# Patient Record
Sex: FEMALE | Race: WHITE | NOT HISPANIC OR LATINO | Employment: UNEMPLOYED | URBAN - NONMETROPOLITAN AREA
[De-identification: names, ages, dates, MRNs, and addresses within clinical notes are randomized per-mention and may not be internally consistent; named-entity substitution may affect disease eponyms.]

---

## 2018-06-12 ENCOUNTER — OFFICE VISIT (OUTPATIENT)
Dept: PSYCHIATRY | Facility: CLINIC | Age: 53
End: 2018-06-12

## 2018-06-12 DIAGNOSIS — F31.30 BIPOLAR I DISORDER, MOST RECENT EPISODE DEPRESSED (HCC): Primary | ICD-10-CM

## 2018-06-12 PROCEDURE — 90791 PSYCH DIAGNOSTIC EVALUATION: CPT | Performed by: SOCIAL WORKER

## 2018-06-12 NOTE — PROGRESS NOTES
"IDENTIFYING INFORMATION:   The patient is a 52 y.o. female who is here today for initial appointment from 2:40 PM to 3:40 PM.  Patient's aunt, Nadya Harris, who has Medical POA, brought patient to appointment and spoke with therapist prior to therapist meeting with patient.  Patient's aunt reports that she picked up patient from a psychiatric hospital in [place] a couple of weeks ago.  She reports patient has alienated all of her other family, lost her food stamps, ran away to [place] where she got into some kind of altercation, police were called, and she ended up in the psychiatric hospital.  She reports that patient's right hand and shoulder were injured during the altercation with police and she is not able to use it normally.  Nadya states that she insisted on having the POA before she would  her niece.  Patient is currently staying with her, and POA is trying to help her get disability, medical treatment for her arm and other services that she needs.  She feels the patient needs an intensive program with lots of therapy, and will continue her efforts to find an appropriate day program for patient.    CHIEF COMPLIANT:  Patient said that she thinks she has been \"severely depressed for a really long time.\"  She reports that her aunt picked her up from a psychiatric unit in [place].  She does not recall the name of the hospital or the town it was in.  She reports she went there for a mini vacation, and \"basically had a nervous breakdown.\"  She was in the hospital for 5 weeks and has been here with her aunt for 2 weeks.  She states that she did not give her permission to have a POA and doesn't want one.    HPI:  Patient reports loss of interest in things she used to enjoy.  She overeats to fill the void and gains weight easily.  She feels fatigued most days, has poor concentration, feels hopeless, helpless, worthless, and guilty about things she can't control.  She reports that she " does not have thoughts of harming herself, but she doesn't want to live with the pain. She sleeps 10-12 hours a night but has trouble falling asleep because of racing thoughts.  Patient reports having manic episodes in the past but has not had any in the last 1-1/2 years.  She reports when manic she had less need for sleep, increased energy and increased level of activity.  She also had pressured speech, flight of ideas, was easily distracted, and would engage in excessive gambling or buying sprees.  She reports that while she was staying with family in [place], after her divorce, she was also delusional, with ideas that she was going to  princes or other similar ideas.  Although she had no goal, she wanted to run away as an escape.    Patient is currently taking Risperdal which was prescribed in [place].  She prefers to take as little medication as possible because of concern about side effects or adverse reactions.    PAST PSYCHIATRIC HISTORY:   No hospitalizations prior to the recent one in [place].  She reports attending marriage counseling for a short time in the last few years of her marriage.  She also made one suicide attempt (took pills ) as a teenager, but was not treated.        SUBSTANCE ABUSE HISTORY:  Patient reports no drug abuse.  She states that she would drink too much when she did drink 6 or 7 times a year, but denies binging on alcohol.  Please note discrepancies between patient's current report and the discharge summary from [place] scanned into the chart.    MEDICAL HISTORY:  Poor hearing.  Currently has a torn right rotator cuff and can't close her right hand; possible nerve damage.  Patient reports having one .  Had 5 or 6 ear operations as a child.      CURRENT MEDICATIONS:  No current outpatient prescriptions on file.     No current facility-administered medications for this visit.          FAMILY HISTORY:  Patient reports her father had dementia and was  "alcoholic.  Her son and nephews are also alcoholics.  No other diagnosed mental health or substance abuse problems, but patient states \"I see signs.\"       SOCIAL HISTORY:  Patient reports she was raised by both parents in [place].  She has 1 sister.  Her childhood was \"average,\" but there was not a lot of affection expressed.  There might have been emotional abuse.  Her father was a mean drunk, but not physically abusive.      Patient reports she struggled with school due to her poor hearing.  It got worse in the last 5 years.  She had a few friends, but was awkward and never felt like she fit in.  She did not participate in school activities and less cool in the 10th grade.  Patient got pregnant and had her first child at age 18.  She  the father of her child and they had 3 children together.  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  The marriage lasted for 8 years.  Patient's second marriage lasted for 18 years.  They had 1 child together and patient was again a stay-at-home mom.  All of her children are now grown and living independently.  After her second divorce patient's circumstances got much worse.  She was living with her father in [place] the last 2 years, prior to her trip to [place].  Currently she does not have contact with her family other than her aunt, with whom she is staying.    Patient has never had a full time job.  She would like to get her life together, buy a car and be independent again, but she doesn't know how that can happen at this point.    Patient states that she believes in God, but does not attend Muslim currently because she is reliant on other people for transportation.  She reports that she tries to exercise, but has no hobbies and there is little she is able to do, except whatever her aunt does.      MENTAL STATUS EXAM:   Hygiene:   good  Cooperation:  Guarded  Eye Contact:  Fair  Psychomotor Behavior:  Appropriate  Affect:  Restricted  Hopelessness: 6  Speech:  Normal  Thought " Process:  Goal directed  Thought Content:  Normal  Suicidal:  Passive suicidal thoughts  Homicidal:  None  Hallucinations:  None  Delusion:  None  Memory:  Fair  Orientation:  Person, Place, Time and Situation  Reliability:  poor  Insight:  Poor  Judgement:  Fair  Impulse Control:  Fair  Physical/Medical Issues:  Injury to right shoulder/arm/hand    PROBLEM LIST:  Bipolar disorder, no income, dependent on her aunt for financial and other support.      STRENGTHS:  Motivated for treatment to get better.       WEAKNESSES:  Suspicious and distrustful.      SHORT-TERM GOALS: Patient will be compliant with clinic appointments.  Patient will be engaged in therapy, medication compliant with minimal side effects. Patient  will report decrease of symptoms and frequency.    LONG-TERM GOALS: Patient will have cessation of symptoms and be able to function at optimal levels without continued treatment.     DIAGNOSIS:     ICD-10-CM ICD-9-CM   1. Bipolar I disorder, most recent episode depressed F31.30 296.50         PLAN:   Patient will continue in biweekly individual outpatient treatment and pharmacotherapy as scheduled.  Patient has appointment with SHANIKA Posada next week for medication management, and appointment with therapist in 3 weeks.     Patient's aunt will also continue her efforts to find appropriate services to meet patient's needs.     The patient was instructed to contact the clinic, call 911, or present to the nearest emergency room if crisis occurs.         Layla Persaud LCSW

## 2018-06-14 ENCOUNTER — HOSPITAL ENCOUNTER (INPATIENT)
Facility: HOSPITAL | Age: 53
LOS: 4 days | Discharge: HOME | End: 2018-06-18
Attending: PSYCHIATRY & NEUROLOGY | Admitting: PSYCHIATRY & NEUROLOGY

## 2018-06-14 ENCOUNTER — HOSPITAL ENCOUNTER (EMERGENCY)
Facility: HOSPITAL | Age: 53
Discharge: STILL ADMITTED OR ADMINISTRATIVE | End: 2018-06-14
Attending: EMERGENCY MEDICINE

## 2018-06-14 VITALS
RESPIRATION RATE: 18 BRPM | TEMPERATURE: 98.4 F | SYSTOLIC BLOOD PRESSURE: 124 MMHG | HEART RATE: 68 BPM | BODY MASS INDEX: 28.52 KG/M2 | OXYGEN SATURATION: 100 % | HEIGHT: 62 IN | WEIGHT: 155 LBS | DIASTOLIC BLOOD PRESSURE: 76 MMHG

## 2018-06-14 DIAGNOSIS — F33.2 SEVERE EPISODE OF RECURRENT MAJOR DEPRESSIVE DISORDER, WITHOUT PSYCHOTIC FEATURES (HCC): Primary | ICD-10-CM

## 2018-06-14 PROBLEM — F32.9 MDD (MAJOR DEPRESSIVE DISORDER): Status: ACTIVE | Noted: 2018-06-14

## 2018-06-14 LAB
6-ACETYL MORPHINE: NEGATIVE
ALBUMIN SERPL-MCNC: 4.5 G/DL (ref 3.5–5)
ALBUMIN/GLOB SERPL: 1.9 G/DL (ref 1.5–2.5)
ALP SERPL-CCNC: 73 U/L (ref 35–104)
ALT SERPL W P-5'-P-CCNC: 15 U/L (ref 10–36)
AMPHET+METHAMPHET UR QL: NEGATIVE
ANION GAP SERPL CALCULATED.3IONS-SCNC: 3.8 MMOL/L (ref 3.6–11.2)
AST SERPL-CCNC: 14 U/L (ref 10–30)
BARBITURATES UR QL SCN: NEGATIVE
BASOPHILS # BLD AUTO: 0.06 10*3/MM3 (ref 0–0.3)
BASOPHILS NFR BLD AUTO: 0.7 % (ref 0–2)
BENZODIAZ UR QL SCN: NEGATIVE
BILIRUB SERPL-MCNC: 0.4 MG/DL (ref 0.2–1.8)
BILIRUB UR QL STRIP: NEGATIVE
BUN BLD-MCNC: 10 MG/DL (ref 7–21)
BUN/CREAT SERPL: 12.8 (ref 7–25)
BUPRENORPHINE SERPL-MCNC: NEGATIVE NG/ML
CALCIUM SPEC-SCNC: 9 MG/DL (ref 7.7–10)
CANNABINOIDS SERPL QL: NEGATIVE
CHLORIDE SERPL-SCNC: 111 MMOL/L (ref 99–112)
CLARITY UR: CLEAR
CO2 SERPL-SCNC: 28.2 MMOL/L (ref 24.3–31.9)
COCAINE UR QL: NEGATIVE
COLOR UR: YELLOW
CREAT BLD-MCNC: 0.78 MG/DL (ref 0.43–1.29)
DEPRECATED RDW RBC AUTO: 43.2 FL (ref 37–54)
EOSINOPHIL # BLD AUTO: 0.14 10*3/MM3 (ref 0–0.7)
EOSINOPHIL NFR BLD AUTO: 1.6 % (ref 0–5)
ERYTHROCYTE [DISTWIDTH] IN BLOOD BY AUTOMATED COUNT: 13.1 % (ref 11.5–14.5)
ETHANOL BLD-MCNC: <10 MG/DL
ETHANOL UR QL: <0.01 %
GFR SERPL CREATININE-BSD FRML MDRD: 78 ML/MIN/1.73
GLOBULIN UR ELPH-MCNC: 2.4 GM/DL
GLUCOSE BLD-MCNC: 75 MG/DL (ref 70–110)
GLUCOSE UR STRIP-MCNC: NEGATIVE MG/DL
HCT VFR BLD AUTO: 39.3 % (ref 37–47)
HGB BLD-MCNC: 13 G/DL (ref 12–16)
HGB UR QL STRIP.AUTO: NEGATIVE
IMM GRANULOCYTES # BLD: 0.04 10*3/MM3 (ref 0–0.03)
IMM GRANULOCYTES NFR BLD: 0.5 % (ref 0–0.5)
KETONES UR QL STRIP: NEGATIVE
LEUKOCYTE ESTERASE UR QL STRIP.AUTO: NEGATIVE
LYMPHOCYTES # BLD AUTO: 2.69 10*3/MM3 (ref 1–3)
LYMPHOCYTES NFR BLD AUTO: 30.8 % (ref 21–51)
MAGNESIUM SERPL-MCNC: 2.2 MG/DL (ref 1.7–2.6)
MCH RBC QN AUTO: 30.9 PG (ref 27–33)
MCHC RBC AUTO-ENTMCNC: 33.1 G/DL (ref 33–37)
MCV RBC AUTO: 93.3 FL (ref 80–94)
METHADONE UR QL SCN: NEGATIVE
MONOCYTES # BLD AUTO: 0.52 10*3/MM3 (ref 0.1–0.9)
MONOCYTES NFR BLD AUTO: 6 % (ref 0–10)
NEUTROPHILS # BLD AUTO: 5.28 10*3/MM3 (ref 1.4–6.5)
NEUTROPHILS NFR BLD AUTO: 60.4 % (ref 30–70)
NITRITE UR QL STRIP: NEGATIVE
OPIATES UR QL: NEGATIVE
OSMOLALITY SERPL CALC.SUM OF ELEC: 282.7 MOSM/KG (ref 273–305)
OXYCODONE UR QL SCN: NEGATIVE
PCP UR QL SCN: NEGATIVE
PH UR STRIP.AUTO: 8 [PH] (ref 5–8)
PLATELET # BLD AUTO: 352 10*3/MM3 (ref 130–400)
PMV BLD AUTO: 8.8 FL (ref 6–10)
POTASSIUM BLD-SCNC: 4 MMOL/L (ref 3.5–5.3)
PROT SERPL-MCNC: 6.9 G/DL (ref 6–8)
PROT UR QL STRIP: NEGATIVE
RBC # BLD AUTO: 4.21 10*6/MM3 (ref 4.2–5.4)
SODIUM BLD-SCNC: 143 MMOL/L (ref 135–153)
SP GR UR STRIP: 1.01 (ref 1–1.03)
UROBILINOGEN UR QL STRIP: NORMAL
WBC NRBC COR # BLD: 8.73 10*3/MM3 (ref 4.5–12.5)

## 2018-06-14 PROCEDURE — 80307 DRUG TEST PRSMV CHEM ANLYZR: CPT | Performed by: PHYSICIAN ASSISTANT

## 2018-06-14 PROCEDURE — 83735 ASSAY OF MAGNESIUM: CPT | Performed by: PHYSICIAN ASSISTANT

## 2018-06-14 PROCEDURE — 81003 URINALYSIS AUTO W/O SCOPE: CPT | Performed by: PHYSICIAN ASSISTANT

## 2018-06-14 PROCEDURE — 80053 COMPREHEN METABOLIC PANEL: CPT | Performed by: PHYSICIAN ASSISTANT

## 2018-06-14 PROCEDURE — 93010 ELECTROCARDIOGRAM REPORT: CPT | Performed by: INTERNAL MEDICINE

## 2018-06-14 PROCEDURE — 85025 COMPLETE CBC W/AUTO DIFF WBC: CPT | Performed by: PHYSICIAN ASSISTANT

## 2018-06-14 PROCEDURE — 93005 ELECTROCARDIOGRAM TRACING: CPT | Performed by: PSYCHIATRY & NEUROLOGY

## 2018-06-14 RX ORDER — LOPERAMIDE HYDROCHLORIDE 2 MG/1
2 CAPSULE ORAL 4 TIMES DAILY PRN
Status: DISCONTINUED | OUTPATIENT
Start: 2018-06-14 | End: 2018-06-18 | Stop reason: HOSPADM

## 2018-06-14 RX ORDER — RISPERIDONE 3 MG/1
3 TABLET ORAL NIGHTLY
Status: CANCELLED | OUTPATIENT
Start: 2018-06-14

## 2018-06-14 RX ORDER — ECHINACEA PURPUREA EXTRACT 125 MG
2 TABLET ORAL AS NEEDED
Status: DISCONTINUED | OUTPATIENT
Start: 2018-06-14 | End: 2018-06-18 | Stop reason: HOSPADM

## 2018-06-14 RX ORDER — BENZONATATE 100 MG/1
100 CAPSULE ORAL 3 TIMES DAILY PRN
Status: DISCONTINUED | OUTPATIENT
Start: 2018-06-14 | End: 2018-06-18 | Stop reason: HOSPADM

## 2018-06-14 RX ORDER — ONDANSETRON 4 MG/1
4 TABLET, FILM COATED ORAL EVERY 6 HOURS PRN
Status: DISCONTINUED | OUTPATIENT
Start: 2018-06-14 | End: 2018-06-18 | Stop reason: HOSPADM

## 2018-06-14 RX ORDER — RISPERIDONE 3 MG/1
3 TABLET ORAL NIGHTLY
COMMUNITY
End: 2018-06-18 | Stop reason: HOSPADM

## 2018-06-14 RX ORDER — HYDROXYZINE 50 MG/1
50 TABLET, FILM COATED ORAL EVERY 6 HOURS PRN
Status: DISCONTINUED | OUTPATIENT
Start: 2018-06-14 | End: 2018-06-18 | Stop reason: HOSPADM

## 2018-06-14 RX ORDER — BENZTROPINE MESYLATE 1 MG/ML
0.5 INJECTION INTRAMUSCULAR; INTRAVENOUS DAILY PRN
Status: DISCONTINUED | OUTPATIENT
Start: 2018-06-14 | End: 2018-06-18 | Stop reason: HOSPADM

## 2018-06-14 RX ORDER — TRAZODONE HYDROCHLORIDE 50 MG/1
50 TABLET ORAL NIGHTLY PRN
Status: DISCONTINUED | OUTPATIENT
Start: 2018-06-14 | End: 2018-06-18 | Stop reason: HOSPADM

## 2018-06-14 RX ORDER — IBUPROFEN 600 MG/1
600 TABLET ORAL EVERY 6 HOURS PRN
Status: DISCONTINUED | OUTPATIENT
Start: 2018-06-14 | End: 2018-06-18 | Stop reason: HOSPADM

## 2018-06-14 RX ORDER — FAMOTIDINE 20 MG/1
20 TABLET, FILM COATED ORAL 2 TIMES DAILY PRN
Status: DISCONTINUED | OUTPATIENT
Start: 2018-06-14 | End: 2018-06-18 | Stop reason: HOSPADM

## 2018-06-14 RX ORDER — RISPERIDONE 3 MG/1
3 TABLET ORAL NIGHTLY
Status: DISCONTINUED | OUTPATIENT
Start: 2018-06-14 | End: 2018-06-15

## 2018-06-14 RX ORDER — ALUMINA, MAGNESIA, AND SIMETHICONE 2400; 2400; 240 MG/30ML; MG/30ML; MG/30ML
15 SUSPENSION ORAL EVERY 6 HOURS PRN
Status: DISCONTINUED | OUTPATIENT
Start: 2018-06-14 | End: 2018-06-18 | Stop reason: HOSPADM

## 2018-06-14 RX ORDER — BENZTROPINE MESYLATE 1 MG/1
1 TABLET ORAL DAILY PRN
Status: DISCONTINUED | OUTPATIENT
Start: 2018-06-14 | End: 2018-06-18 | Stop reason: HOSPADM

## 2018-06-14 RX ADMIN — RISPERIDONE 3 MG: 3 TABLET, FILM COATED ORAL at 21:10

## 2018-06-14 NOTE — ED PROVIDER NOTES
Subjective   52-year-old female presents to the ED today for a mental health evaluation.  She states about 1 month ago she was admitted to psychiatric unit in [place] for a nervous breakdown.  She states she has started to feel that way again over the last 2-3 days.  She states she feels very hopeless and worthless.  She states her life feels out of control.  She denies any current suicidal or homicidal ideations.  She states she has been eating and sleeping more than normal.  She denies any hallucinations.  She states she stopped taking her medications 3 or 4 days ago because she did not feel like they were the right medications for her.        History provided by:  Patient  Mental Health Problem   Presenting symptoms: depression    Presenting symptoms: no hallucinations and no suicidal thoughts    Degree of incapacity (severity):  Moderate  Onset quality:  Gradual  Duration:  3 days  Timing:  Constant  Progression:  Worsening  Chronicity:  Recurrent  Context: noncompliance    Context: not alcohol use and not drug abuse    Relieved by:  Nothing  Worsened by:  Nothing  Associated symptoms: anhedonia, anxiety, appetite change and feelings of worthlessness    Risk factors: hx of mental illness        Review of Systems   Constitutional: Positive for appetite change.   HENT: Negative.    Eyes: Negative.    Respiratory: Negative.    Cardiovascular: Negative.    Gastrointestinal: Negative.    Genitourinary: Negative.    Musculoskeletal: Negative.    Skin: Negative.    Neurological: Negative.    Psychiatric/Behavioral: Positive for dysphoric mood. Negative for hallucinations and suicidal ideas. The patient is nervous/anxious.    All other systems reviewed and are negative.      Past Medical History:   Diagnosis Date   • Anxiety    • Bipolar disorder    • Depression    • Hearing loss, bilateral     since childhood   • Nerve damage     R arm/hand   • Rotator cuff tear     Right   • Schizoaffective disorder    •  "Suicide attempt     \"I overdosed\"  as a teenager       No Known Allergies    Past Surgical History:   Procedure Laterality Date   • BREAST BIOPSY Left 2017   • MIDDLE EAR SURGERY Bilateral     numerous surgeries in childhhood       Family History   Problem Relation Age of Onset   • Alcohol abuse Father        Social History     Social History   • Marital status:      Social History Main Topics   • Smoking status: Never Smoker   • Smokeless tobacco: Never Used      Comment: \"I try not to smoke.\"   • Alcohol use Yes      Comment: occasional use.    • Drug use: No   • Sexual activity: No     Other Topics Concern   • Not on file           Objective   Physical Exam   Constitutional: She is oriented to person, place, and time. She appears well-developed and well-nourished. No distress.   HENT:   Head: Normocephalic and atraumatic.   Right Ear: External ear normal.   Left Ear: External ear normal.   Nose: Nose normal.   Mouth/Throat: Oropharynx is clear and moist.   Eyes: Conjunctivae and EOM are normal. Pupils are equal, round, and reactive to light.   Neck: Normal range of motion. Neck supple.   Cardiovascular: Normal rate, regular rhythm, normal heart sounds and intact distal pulses.    Pulmonary/Chest: Effort normal and breath sounds normal.   Abdominal: Soft. Bowel sounds are normal.   Musculoskeletal: Normal range of motion.   Neurological: She is alert and oriented to person, place, and time.   Skin: Skin is warm and dry. Capillary refill takes less than 2 seconds.   Psychiatric: Her speech is normal and behavior is normal. Judgment normal. Her mood appears anxious. Cognition and memory are normal. She exhibits a depressed mood. She expresses no homicidal and no suicidal ideation.   Nursing note and vitals reviewed.      Procedures       Results for orders placed or performed during the hospital encounter of 06/14/18   Comprehensive Metabolic Panel   Result Value Ref Range    Glucose 75 70 - 110 mg/dL    BUN " 10 7 - 21 mg/dL    Creatinine 0.78 0.43 - 1.29 mg/dL    Sodium 143 135 - 153 mmol/L    Potassium 4.0 3.5 - 5.3 mmol/L    Chloride 111 99 - 112 mmol/L    CO2 28.2 24.3 - 31.9 mmol/L    Calcium 9.0 7.7 - 10.0 mg/dL    Total Protein 6.9 6.0 - 8.0 g/dL    Albumin 4.50 3.50 - 5.00 g/dL    ALT (SGPT) 15 10 - 36 U/L    AST (SGOT) 14 10 - 30 U/L    Alkaline Phosphatase 73 35 - 104 U/L    Total Bilirubin 0.4 0.2 - 1.8 mg/dL    eGFR Non African Amer 78 >60 mL/min/1.73    Globulin 2.4 gm/dL    A/G Ratio 1.9 1.5 - 2.5 g/dL    BUN/Creatinine Ratio 12.8 7.0 - 25.0    Anion Gap 3.8 3.6 - 11.2 mmol/L   Ethanol   Result Value Ref Range    Ethanol <10 <=10 mg/dL    Ethanol % <0.010 %   Urinalysis With / Culture If Indicated - Urine, Clean Catch   Result Value Ref Range    Color, UA Yellow Yellow, Straw    Appearance, UA Clear Clear    pH, UA 8.0 5.0 - 8.0    Specific Gravity, UA 1.007 1.005 - 1.030    Glucose, UA Negative Negative    Ketones, UA Negative Negative    Bilirubin, UA Negative Negative    Blood, UA Negative Negative    Protein, UA Negative Negative    Leuk Esterase, UA Negative Negative    Nitrite, UA Negative Negative    Urobilinogen, UA 0.2 E.U./dL 0.2 - 1.0 E.U./dL   Urine Drug Screen - Urine, Clean Catch   Result Value Ref Range    Amphetamine Screen, Urine Negative Negative    Barbiturates Screen, Urine Negative Negative    Benzodiazepine Screen, Urine Negative Negative    Cocaine Screen, Urine Negative Negative    Methadone Screen, Urine Negative Negative    Opiate Screen Negative Negative    Phencyclidine (PCP), Urine Negative Negative    THC, Screen, Urine Negative Negative    6-ACETYL MORPHINE Negative Negative    Buprenorphine, Screen, Urine Negative Negative    Oxycodone Screen, Urine Negative Negative   Magnesium   Result Value Ref Range    Magnesium 2.2 1.7 - 2.6 mg/dL   CBC Auto Differential   Result Value Ref Range    WBC 8.73 4.50 - 12.50 10*3/mm3    RBC 4.21 4.20 - 5.40 10*6/mm3    Hemoglobin 13.0 12.0 -  16.0 g/dL    Hematocrit 39.3 37.0 - 47.0 %    MCV 93.3 80.0 - 94.0 fL    MCH 30.9 27.0 - 33.0 pg    MCHC 33.1 33.0 - 37.0 g/dL    RDW 13.1 11.5 - 14.5 %    RDW-SD 43.2 37.0 - 54.0 fl    MPV 8.8 6.0 - 10.0 fL    Platelets 352 130 - 400 10*3/mm3    Neutrophil % 60.4 30.0 - 70.0 %    Lymphocyte % 30.8 21.0 - 51.0 %    Monocyte % 6.0 0.0 - 10.0 %    Eosinophil % 1.6 0.0 - 5.0 %    Basophil % 0.7 0.0 - 2.0 %    Immature Grans % 0.5 0.0 - 0.5 %    Neutrophils, Absolute 5.28 1.40 - 6.50 10*3/mm3    Lymphocytes, Absolute 2.69 1.00 - 3.00 10*3/mm3    Monocytes, Absolute 0.52 0.10 - 0.90 10*3/mm3    Eosinophils, Absolute 0.14 0.00 - 0.70 10*3/mm3    Basophils, Absolute 0.06 0.00 - 0.30 10*3/mm3    Immature Grans, Absolute 0.04 (H) 0.00 - 0.03 10*3/mm3   Osmolality, Calculated   Result Value Ref Range    Osmolality Calc 282.7 273.0 - 305.0 mOsm/kg       ED Course  ED Course as of Jun 14 1940   Thu Jun 14, 2018   1630 Medically clear for psych  [AH]      ED Course User Index  [AH] HARLEY Moyer                  [facility]  Number of Diagnoses or Management Options  Severe episode of recurrent major depressive disorder, without psychotic features:      Amount and/or Complexity of Data Reviewed  Clinical lab tests: reviewed    Patient Progress  Patient progress: stable        Final diagnoses:   Severe episode of recurrent major depressive disorder, without psychotic features            HARLEY Moyer  06/14/18 1940

## 2018-06-14 NOTE — NURSING NOTE
Family present and reports that she has been off meds and is belligerent with her and threatening people and has hx of longterm time several times and has alienated herself from family. She reports she needs help.

## 2018-06-14 NOTE — NURSING NOTE
Intake assessment completed. Patient brought in by aunt . She reports increased depression the last few days. With intermittent si. Pt refuses to disclose plan. Vague at times. She also reports that she quit taking her riper adol a few days ago cause it dont seem to be working ng. She reports feeling mistrustful and scared. Anxiety and depression rated 9/10. Denies any drug use.

## 2018-06-15 PROBLEM — F31.62 BIPOLAR DISORDER, CURRENT EPISODE MIXED, MODERATE (HCC): Status: ACTIVE | Noted: 2018-06-14

## 2018-06-15 PROCEDURE — 99223 1ST HOSP IP/OBS HIGH 75: CPT | Performed by: PSYCHIATRY & NEUROLOGY

## 2018-06-15 RX ORDER — ARIPIPRAZOLE 10 MG/1
5 TABLET ORAL DAILY
Status: DISCONTINUED | OUTPATIENT
Start: 2018-06-15 | End: 2018-06-17

## 2018-06-15 RX ADMIN — ARIPIPRAZOLE 5 MG: 10 TABLET ORAL at 15:32

## 2018-06-15 NOTE — PLAN OF CARE
Problem: Patient Care Overview  Goal: Plan of Care Review  Outcome: Ongoing (interventions implemented as appropriate)   06/15/18 0217   Coping/Psychosocial   Plan of Care Reviewed With patient   Coping/Psychosocial   Patient Agreement with Plan of Care agrees   Plan of Care Review   Progress no change   OTHER   Outcome Summary new admit

## 2018-06-15 NOTE — PROGRESS NOTES
1814  Therapist spoke with patient's aunt, Nadya Harris, via phone for collateral.  Nadya reports she has medical POA over patient since picking up patient from a hospital in [place] in May 2018.  She reports prior to that patient was incarcerated in [facility] from 2017 to 2018.  Nadya reports patient to be diagnosed with Bipolar Disorder.  She reports patient to have manic episodes, irrational behaviors, sexually promiscuous, and sometimes experiences psychosis.  Nadya discussed patient displays impulsive behaviors of stealing plates and driving throughout the country on  tags, which resulted in her incarceration.  She reports patient will excessively drink Monster energy drinks when manic.  She discussed patient has several somatic complaints when nothing is wrong, particularly that her ears hurt.  Nadya discussed patient does not comply with medications sometimes because she feels the medicines make her too flat.    Nadya reports that patient's sister is diagnosed with Bipolar Disorder.  Nadya discussed that when patient is at baseline that she displays irrational, paranoid behavior.Nadya stated that patient has long history of mental illness beginning as a teenager.  Nadya discussed patient's symptoms exacerbated 3 years ago when patient went through a divorce.  Nadya stated that patient's  called the police many times due to patient wandering at night, going missing, and threatening to harm others.  She discussed that patient currently has a torn rotator cuff from resisting arrest.   Nadya reports she will fax patient's medical records from [place] and POA paperwork today.  She stated that POA paperwork is also in patient's [facility] record.  She stated that the home is safeguarded and patient can live with her upon discharge.  Nadya reports she will transport patient home and to aftercare appointments upon discharge.
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
## 2018-06-15 NOTE — PLAN OF CARE
Problem: Patient Care Overview  Goal: Plan of Care Review  Outcome: Ongoing (interventions implemented as appropriate)   06/15/18 1311   Coping/Psychosocial   Plan of Care Reviewed With patient   Coping/Psychosocial   Patient Agreement with Plan of Care agrees   Plan of Care Review   Progress no change   OTHER   Outcome Summary Completed initial assessment, discussed alternative aftercare resources and expectations of treatment; reviewed treatment plan.   Coping/Psychosocial   Consent Given to Review Plan with Aunt and [facility].     Goal: Individualization and Mutuality  Outcome: Ongoing (interventions implemented as appropriate)   06/15/18 1311   Personal Strengths/Vulnerabilities   Patient Personal Strengths expressive of needs;expressive of emotions;motivated for treatment   Patient Vulnerabilities Ineffective coping skills, limited insight.   Individualization   Patient Specific Preferences Mood stabilizaiton.   Patient Specific Goals (Include Timeframe) Identify 3 healthy coping skills, deny all SI, HI, and AVH prior to discharge.   Patient Specific Interventions Illness education, scheduling aftercare.     Goal: Discharge Needs Assessment  Outcome: Ongoing (interventions implemented as appropriate)   06/15/18 1311   Discharge Needs Assessment   Readmission Within the Last 30 Days no previous admission in last 30 days   Concerns to be Addressed mental health;suicidal;decision making;coping/stress;discharge planning   Patient/Family Anticipates Transition to home with family   Patient/Family Anticipated Services at Transition mental health services;outpatient care   Transportation Concerns car, none   Transportation Anticipated family or friend will provide   Patient's Choice of Community Agency(s) [facility].   Current Discharge Risk psychiatric illness;lack of support system/caregiver   Discharge Coordination/Progress Patient anticiapted to return home with aunt and have aftercare scheduled with  [facility] upon discharge. Patient has [employer] insurance.   Discharge Needs Assessment,    Outpatient/Agency/Support Group Needs outpatient psychiatric care (specify);outpatient counseling;outpatient medication management   Anticipated Discharge Disposition home or self-care   DATA:           Therapist met individually with patient this date to introduce role and to discuss hospitalization expectations. Patient agreeable.        Therapist completed integrated summary, treatment plan, and initiated social history this date. Therapist is strongly recommending a family session prior to discharge.      Patient consented for aunt, who is POA, to be involved with treatment.  Therapist will continue efforts to contact POA.          ASSESSMENT:       Kelly is a 52 year-old ,  female living in [facility].  She presents as voluntary admit with reports of suicidal thoughts.  Patient reports acute increase in depression with symptoms of anhedonia, low mood, poor motivation, and feeling hopeless, helpless, and worthless.  She discussed increased anxiety with having panic attacks and feeling like she is dying.  Patient discussed stressors of feeling her family blame and  her regarding her divorce.  Patient discussed her aunt recently obtained medical POA when patient was in [place].  Patient reported she plans to return home living with her aunt temporarily upon discharge.  Patient discussed history of outpatient treatment and history of acute psych treatment out of state.  Patient reports unresolved grief and increased depression since her divorce 3 years ago.  Patient currently preoccupied with discharge.  She denied HI and AVH.  Patient denied legal issues.  She denied substance abuse.  Patient appeared oriented x3 and displayed poor insight.  She displayed pressured speech and restricted affect.  Patient agreeable for follow up with [facility].          PLAN:       Treatment team will focus  efforts on stabilizing patient's acute symptoms while providing education on healthy coping and crisis management to reduce hospitalizations. Patient requires daily psychiatrist evaluation and 24/7 nursing supervision to promote patient safety.      Therapist will offer 1-4 individual sessions (20-30 minutes each), 1 therapy group daily, family education, and appropriate referral.      Patient and her aunt consented for patient to have aftercare with [facility] upon discharge.  Patient will return home with aunt transporting upon discharge.

## 2018-06-16 LAB
CHOLEST SERPL-MCNC: 166 MG/DL (ref 0–200)
HBA1C MFR BLD: 5.1 % (ref 4.5–5.7)
HDLC SERPL-MCNC: 74 MG/DL (ref 60–100)
LDLC SERPL CALC-MCNC: 70 MG/DL (ref 0–100)
LDLC/HDLC SERPL: 0.94 {RATIO}
TRIGL SERPL-MCNC: 111 MG/DL (ref 0–150)
VLDLC SERPL-MCNC: 22.2 MG/DL

## 2018-06-16 PROCEDURE — 99232 SBSQ HOSP IP/OBS MODERATE 35: CPT | Performed by: PSYCHIATRY & NEUROLOGY

## 2018-06-16 PROCEDURE — 80061 LIPID PANEL: CPT | Performed by: PSYCHIATRY & NEUROLOGY

## 2018-06-16 PROCEDURE — 83036 HEMOGLOBIN GLYCOSYLATED A1C: CPT | Performed by: PSYCHIATRY & NEUROLOGY

## 2018-06-16 RX ADMIN — ARIPIPRAZOLE 5 MG: 10 TABLET ORAL at 09:31

## 2018-06-16 NOTE — PLAN OF CARE
Problem: Patient Care Overview  Goal: Plan of Care Review  Outcome: Ongoing (interventions implemented as appropriate)   06/16/18 1401   Coping/Psychosocial   Plan of Care Reviewed With patient   Coping/Psychosocial   Patient Agreement with Plan of Care agrees   Plan of Care Review   Progress no change   OTHER   Outcome Summary Patient labile this AM wanting to leave. Patient advised of MD note with no plans of discharge at this time. Patient has a flat affect and has difficulty following directions at times and difficulty focusing on task at hand. Patient appears to be responding to internal stimuli as noted by hesitation as if listening to something when asked a question. Patient is redirectable at present. Will continue to monitor.     Goal: Individualization and Mutuality  Outcome: Ongoing (interventions implemented as appropriate)    Goal: Discharge Needs Assessment  Outcome: Ongoing (interventions implemented as appropriate)    Goal: Interprofessional Rounds/Family Conf  Outcome: Ongoing (interventions implemented as appropriate)      Problem: Overarching Goals (Adult)  Goal: Adheres to Safety Considerations for Self and Others  Outcome: Ongoing (interventions implemented as appropriate)    Goal: Optimized Coping Skills in Response to Life Stressors  Outcome: Ongoing (interventions implemented as appropriate)    Goal: Develops/Participates in Therapeutic Umbarger to Support Successful Transition  Outcome: Ongoing (interventions implemented as appropriate)

## 2018-06-16 NOTE — PROGRESS NOTES
"      Inpatient Psy Progress Note   Clinician: Eduin Waters MD  Admission Date: 6/14/2018  11:42 AM 06/16/18    Behavioral Health Treatment Plan and Problem List: I have reviewed and approved the Behavioral Health Treatment Plan and Problem list.    Allergies  No Known Allergies    Hospital Day: 2 days      Assessment completed within view of staff    History  CC: inpatient followup  Interval HPI: Patient seen and evaluated by me.  Chart reviewed. Patient presents with rapid, rambling speech during the interview.   Therapist not reviewed.  Patient does have a medical POA.      Interval Review of Systems:   General ROS: negative for - fever or malaise  Endocrine ROS: negative for - palpitations  Respiratory ROS: no cough, shortness of breath, or wheezing  Cardiovascular ROS: no chest pain or dyspnea on exertion  Gastrointestinal ROS: no abdominal pain,no black or bloody stools    /81 (BP Location: Right arm, Patient Position: Sitting)   Pulse 90   Temp 98.8 °F (37.1 °C) (Temporal Artery )   Resp 18   Ht 157.5 cm (62\")   Wt 74.4 kg (164 lb)   LMP  (LMP Unknown)   SpO2 98%   BMI 30.00 kg/m²     Mental Status Exam  Mood: irritable  Affect: mood-congruent   Thought Processes: tangential  Thought Content: paranoid  Hallucinations: no  Suicidal Thoughts: denies  Suicidal Plan/Intent:denies  Hopelesness:Mild  Homicidal Thoughts:  absent      Medical Decision Making:   Labs:     Lab Results (last 24 hours)     Procedure Component Value Units Date/Time    Lipid Panel [[id number]] Collected:  06/16/18 0516    Specimen:  Blood Updated:  06/16/18 0637     Total Cholesterol 166 mg/dL      Triglycerides 111 mg/dL      HDL Cholesterol 74 mg/dL      LDL Cholesterol  70 mg/dL      VLDL Cholesterol 22.2 mg/dL      LDL/HDL Ratio 0.94    Narrative:       Cholesterol Reference Ranges  (U.S. Department of Health and Human Services ATP III Classifications)    Desirable          <200 mg/dL  Borderline High    200-239 " mg/dL  High Risk          >240 mg/dL      Triglyceride Reference Ranges  (U.S. Department of Health and Human Services ATP III Classifications)    Normal           <150 mg/dL  Borderline High  150-199 mg/dL  High             200-499 mg/dL  Very High        >500 mg/dL    HDL Reference Ranges  (U.S. Department of Health and Human Services ATP III Classifcations)    Low     <40 mg/dl (major risk factor for CHD)  High    >60 mg/dl ('negative' risk factor for CHD)        LDL Reference Ranges  (U.S. Department of Health and Human Services ATP III Classifcations)    Optimal          <100 mg/dL  Near Optimal     100-129 mg/dL  Borderline High  130-159 mg/dL  High             160-189 mg/dL  Very High        >189 mg/dL    Hemoglobin A1c [[id number]]  (Normal) Collected:  06/16/18 0516    Specimen:  Blood Updated:  06/16/18 0615     Hemoglobin A1C 5.10 %             Radiology:     Imaging Results (last 24 hours)     ** No results found for the last 24 hours. **            EKG:     ECG/EMG Results (most recent)     Procedure Component Value Units Date/Time    ECG 12 Lead [[id number]] Collected:  06/14/18 1952     Updated:  06/15/18 1038    Narrative:       Test Reason : Potential adverse reaction to medications.  Blood Pressure : **/** mmHG  Vent. Rate : 060 BPM     Atrial Rate : 060 BPM     P-R Int : 140 ms          QRS Dur : 066 ms      QT Int : 436 ms       P-R-T Axes : 040 049 075 degrees     QTc Int : 436 ms    Normal sinus rhythm  Nonspecific ST and T wave abnormality  Abnormal ECG  No previous ECGs available  Confirmed by Ovidio Boyer ([id number]) on 6/15/2018 10:38:46 AM    Referred By:  JOON           Confirmed By:Ovidio Boyer           Medications:     ARIPiprazole 5 mg Oral Daily          All medications reviewed.      Assessment and Plan:    Active Problems:    Bipolar disorder, current episode mixed, moderate        - Continue Abilify 5mg Daily.      Continue hospitalization for safety and stabilization.   Continue current level of Special Precautions (q15 minute checks).

## 2018-06-16 NOTE — PLAN OF CARE
Problem: Patient Care Overview  Goal: Plan of Care Review  Outcome: Ongoing (interventions implemented as appropriate)   06/16/18 0150   Coping/Psychosocial   Plan of Care Reviewed With patient   Coping/Psychosocial   Patient Agreement with Plan of Care agrees   Plan of Care Review   Progress no change   OTHER   Outcome Summary Patient rates anxiety 7 and depression 10. Denies SI/HI and NURIA. Calm and cooperative with staff.

## 2018-06-17 PROCEDURE — 99232 SBSQ HOSP IP/OBS MODERATE 35: CPT | Performed by: PSYCHIATRY & NEUROLOGY

## 2018-06-17 RX ORDER — ARIPIPRAZOLE 10 MG/1
10 TABLET ORAL DAILY
Status: DISCONTINUED | OUTPATIENT
Start: 2018-06-18 | End: 2018-06-18 | Stop reason: HOSPADM

## 2018-06-17 RX ADMIN — ARIPIPRAZOLE 5 MG: 10 TABLET ORAL at 08:24

## 2018-06-17 RX ADMIN — IBUPROFEN 600 MG: 600 TABLET ORAL at 20:22

## 2018-06-17 NOTE — PROGRESS NOTES
"      Inpatient Psy Progress Note   Clinician: Eduin Waters MD  Admission Date: 6/14/2018  12:15 PM 06/17/18    Behavioral Health Treatment Plan and Problem List: I have reviewed and approved the Behavioral Health Treatment Plan and Problem list.    Allergies  No Known Allergies    Hospital Day: 3 days      Assessment completed within view of staff    History  CC: inpatient followup  Interval HPI: Patient seen and evaluated by me.  Chart reviewed.   Patient rates  level of depression (subjectively) at a   3/10.  Anxiety   3/10.  Patient tolerating meds okay.  Denies side effects.  Patient in denial of her mental illness and the severity of her current situation.      Interval Review of Systems:   General ROS: negative for - fever or malaise  Endocrine ROS: negative for - palpitations  Respiratory ROS: no cough, shortness of breath, or wheezing  Cardiovascular ROS: no chest pain or dyspnea on exertion  Gastrointestinal ROS: no abdominal pain,no black or bloody stools    /70 (BP Location: Right arm, Patient Position: Sitting)   Pulse 89   Temp 97.6 °F (36.4 °C) (Temporal Artery )   Resp 18   Ht 157.5 cm (62\")   Wt 74.4 kg (164 lb)   LMP  (LMP Unknown)   SpO2 97%   BMI 30.00 kg/m²     Mental Status Exam  Mood: irritable  Affect: constricted   Thought Processes: tangential  Thought Content: paranoid  Hallucinations: no  Suicidal Thoughts: denies  Suicidal Plan/Intent:denies  Hopelesness:Mild  Homicidal Thoughts:  absent      Medical Decision Making:   Labs:     Lab Results (last 24 hours)     ** No results found for the last 24 hours. **            Radiology:     Imaging Results (last 24 hours)     ** No results found for the last 24 hours. **            EKG:     ECG/EMG Results (most recent)     Procedure Component Value Units Date/Time    ECG 12 Lead [[id number]] Collected:  06/14/18 1952     Updated:  06/15/18 1038    Narrative:       Test Reason : Potential adverse reaction to medications.  Blood " Pressure : **/** mmHG  Vent. Rate : 060 BPM     Atrial Rate : 060 BPM     P-R Int : 140 ms          QRS Dur : 066 ms      QT Int : 436 ms       P-R-T Axes : 040 049 075 degrees     QTc Int : 436 ms    Normal sinus rhythm  Nonspecific ST and T wave abnormality  Abnormal ECG  No previous ECGs available  Confirmed by Ovidio Boyer ([id number]) on 6/15/2018 10:38:46 AM    Referred By:  JOON           Confirmed By:Ovidio Boyer           Medications:     ARIPiprazole 5 mg Oral Daily          All medications reviewed.      Assessment and Plan:    Active Problems:    Bipolar disorder, current episode mixed, moderate        - Increase Abilify to 10mg Daily.         Continue hospitalization for safety and stabilization.  Continue current level of Special Precautions (q15 minute checks).

## 2018-06-17 NOTE — PLAN OF CARE
Problem: Patient Care Overview  Goal: Plan of Care Review  Outcome: Ongoing (interventions implemented as appropriate)   06/17/18 1640   Coping/Psychosocial   Plan of Care Reviewed With patient   Coping/Psychosocial   Patient Agreement with Plan of Care agrees   Plan of Care Review   Progress no change   OTHER   Outcome Summary Patient isolates herself in room.

## 2018-06-17 NOTE — PLAN OF CARE
Problem: Patient Care Overview  Goal: Plan of Care Review  Outcome: Ongoing (interventions implemented as appropriate)   06/17/18 0123   Coping/Psychosocial   Plan of Care Reviewed With patient   Coping/Psychosocial   Patient Agreement with Plan of Care agrees   Plan of Care Review   Progress improving   OTHER   Outcome Summary Pt cooperative this shift. Rates anxiety 4 and depression 6. Denies SI/HI and NURIA.

## 2018-06-18 VITALS
SYSTOLIC BLOOD PRESSURE: 133 MMHG | BODY MASS INDEX: 30.18 KG/M2 | WEIGHT: 164 LBS | HEIGHT: 62 IN | OXYGEN SATURATION: 99 % | HEART RATE: 68 BPM | RESPIRATION RATE: 18 BRPM | TEMPERATURE: 98.7 F | DIASTOLIC BLOOD PRESSURE: 72 MMHG

## 2018-06-18 PROCEDURE — 99238 HOSP IP/OBS DSCHRG MGMT 30/<: CPT | Performed by: PSYCHIATRY & NEUROLOGY

## 2018-06-18 RX ORDER — ARIPIPRAZOLE 10 MG/1
10 TABLET ORAL DAILY
Qty: 30 TABLET | Refills: 0 | Status: SHIPPED | OUTPATIENT
Start: 2018-06-19 | End: 2018-06-25 | Stop reason: SDUPTHER

## 2018-06-18 RX ADMIN — ARIPIPRAZOLE 10 MG: 10 TABLET ORAL at 08:21

## 2018-06-18 NOTE — PLAN OF CARE
Problem: Patient Care Overview  Goal: Plan of Care Review  Outcome: Ongoing (interventions implemented as appropriate)   06/17/18 3604   Coping/Psychosocial   Plan of Care Reviewed With patient   Coping/Psychosocial   Patient Agreement with Plan of Care agrees   Plan of Care Review   Progress improving   OTHER   Outcome Summary Patient out in dayroom this shift. Denies anxiety, depression, SI/HI and NURIA. Continues to have disorganized thoughts.

## 2018-06-18 NOTE — DISCHARGE SUMMARY
"      PSYCHIATRIC DISCHARGE SUMMARY     Patient Identification:  Name:  Kelly Powell  Age:  52 y.o.  Sex:  female  :  1965  MRN:  [id number]  Visit Number:  [id number]      Date of Admission:2018   Date of Discharge:  2018    Discharge Diagnosis:  Active Problems:    Bipolar disorder, current episode mixed, moderate        Admission Diagnosis:  MDD (major depressive disorder) [F32.9]     Hospital Course  Patient is a 52 y.o. female presented with reports of having a \"nervous breakdown\" and a combination of depressive and manic symptoms.  The patient had recently been discharged after a long hospitalization at the [facility].  Apparently they were waiting for a family member to come from [place] to pick the patient up.  The family member brought her here after she quit taking her medications and seemed to decompensate.  The patient was admitted for safety and stabilization.  After being admitted to the hospital, she denied suicidal thoughts.  She presented as talkative and with a circumstantial thought process.  The patient was taken off of risperidone due to concerns about side effects.  The patient was agreeable to a trial of Abilify and we started this with the thought of switching to the long-acting injectable.  However, by the end of the hospitalization the patient refused a long-acting injectable medication.  She was taking Abilify 10 mg daily without side effects.  Her thought process became more linear and organized.  She was still talkative and distractible but no longer having suicidal thoughts.  She slept well throughout the hospitalization.  The patient was encouraged to stay for another day or 2 to see if symptoms would improve more however she was insistent on leaving and agreed to let her aunt give her her medication and verify that she swallows it.  She also was agreeable to following up in the [facility].  The patient did not meet criteria for an " "involuntary hold and was subsequently discharged to the care of her aunt.  Mental Status Exam upon discharge:   Mood \"good\"   Affect- congruent, stable  Speech was talkative but not pressured  Thought Content-goal directed, no delusional material present  Thought process-linear, organized.  Suicidality: No SI  Homicidality: No HI  Perception: No AH/VH    Procedures Performed         Consults:   Consults     No orders found from 5/16/2018 to 6/15/2018.          Pertinent Test Results:   Results for MODESTA OROZCO (MRN [id number]) as of 6/18/2018 13:38   Ref. Range 6/14/2018 15:53 6/14/2018 16:09 6/14/2018 19:52 6/16/2018 05:16   Glucose Latest Ref Range: 70 - 110 mg/dL 75      Sodium Latest Ref Range: 135 - 153 mmol/L 143      Potassium Latest Ref Range: 3.5 - 5.3 mmol/L 4.0      CO2 Latest Ref Range: 24.3 - 31.9 mmol/L 28.2      Chloride Latest Ref Range: 99 - 112 mmol/L 111      Anion Gap Latest Ref Range: 3.6 - 11.2 mmol/L 3.8      Creatinine Latest Ref Range: 0.43 - 1.29 mg/dL 0.78      BUN Latest Ref Range: 7 - 21 mg/dL 10      BUN/Creatinine Ratio Latest Ref Range: 7.0 - 25.0  12.8      Calcium Latest Ref Range: 7.7 - 10.0 mg/dL 9.0      eGFR Non African Amer Latest Ref Range: >60 mL/min/1.73 78      Alkaline Phosphatase Latest Ref Range: 35 - 104 U/L 73      Total Protein Latest Ref Range: 6.0 - 8.0 g/dL 6.9      ALT (SGPT) Latest Ref Range: 10 - 36 U/L 15      AST (SGOT) Latest Ref Range: 10 - 30 U/L 14      Total Bilirubin Latest Ref Range: 0.2 - 1.8 mg/dL 0.4      Albumin Latest Ref Range: 3.50 - 5.00 g/dL 4.50      Globulin Latest Units: gm/dL 2.4      A/G Ratio Latest Ref Range: 1.5 - 2.5 g/dL 1.9      Hemoglobin A1C Latest Ref Range: 4.50 - 5.70 %    5.10   Total Cholesterol Latest Ref Range: 0 - 200 mg/dL    166   HDL Cholesterol Latest Ref Range: 60 - 100 mg/dL    74   LDL Cholesterol  Latest Ref Range: 0 - 100 mg/dL    70   VLDL Cholesterol Latest Units: mg/dL    22.2   Triglycerides Latest Ref Range: " 0 - 150 mg/dL    111   LDL/HDL Ratio Unknown    0.94   Magnesium Latest Ref Range: 1.7 - 2.6 mg/dL 2.2      Osmolality Calc Latest Ref Range: 273.0 - 305.0 mOsm/kg 282.7      WBC Latest Ref Range: 4.50 - 12.50 10*3/mm3 8.73      RBC Latest Ref Range: 4.20 - 5.40 10*6/mm3 4.21      Hemoglobin Latest Ref Range: 12.0 - 16.0 g/dL 13.0      Hematocrit Latest Ref Range: 37.0 - 47.0 % 39.3      RDW Latest Ref Range: 11.5 - 14.5 % 13.1      MCV Latest Ref Range: 80.0 - 94.0 fL 93.3      MCH Latest Ref Range: 27.0 - 33.0 pg 30.9      MCHC Latest Ref Range: 33.0 - 37.0 g/dL 33.1      MPV Latest Ref Range: 6.0 - 10.0 fL 8.8      Platelets Latest Ref Range: 130 - 400 10*3/mm3 352      RDW-SD Latest Ref Range: 37.0 - 54.0 fl 43.2      Neutrophil % Latest Ref Range: 30.0 - 70.0 % 60.4      Lymphocyte % Latest Ref Range: 21.0 - 51.0 % 30.8      Monocyte % Latest Ref Range: 0.0 - 10.0 % 6.0      Eosinophil % Latest Ref Range: 0.0 - 5.0 % 1.6      Basophil % Latest Ref Range: 0.0 - 2.0 % 0.7      Immature Grans % Latest Ref Range: 0.0 - 0.5 % 0.5      Neutrophils, Absolute Latest Ref Range: 1.40 - 6.50 10*3/mm3 5.28      Lymphocytes, Absolute Latest Ref Range: 1.00 - 3.00 10*3/mm3 2.69      Monocytes, Absolute Latest Ref Range: 0.10 - 0.90 10*3/mm3 0.52      Eosinophils, Absolute Latest Ref Range: 0.00 - 0.70 10*3/mm3 0.14      Basophils, Absolute Latest Ref Range: 0.00 - 0.30 10*3/mm3 0.06      Immature Grans, Absolute Latest Ref Range: 0.00 - 0.03 10*3/mm3 0.04 (H)      Color, UA Latest Ref Range: Yellow, Straw   Yellow     Appearance, UA Latest Ref Range: Clear   Clear     Specific [place], UA Latest Ref Range: 1.005 - 1.030   1.007     pH, UA Latest Ref Range: 5.0 - 8.0   8.0     Glucose, UA Latest Ref Range: Negative   Negative     Ketones, UA Latest Ref Range: Negative   Negative     Blood, UA Latest Ref Range: Negative   Negative     Nitrite, UA Latest Ref Range: Negative   Negative     Leuk Esterase, UA Latest Ref Range:  Negative   Negative     Protein, UA Latest Ref Range: Negative   Negative     Bilirubin, UA Latest Ref Range: Negative   Negative     Urobilinogen, UA Latest Ref Range: 0.2 - 1.0 E.U./dL   0.2 E.U./dL     Ethanol % Latest Units: % <0.010      Ethanol Latest Ref Range: <=10 mg/dL <10      6-ACETYL MORPHINE Latest Ref Range: Negative   Negative     Amphetamine Screen, Urine Latest Ref Range: Negative   Negative     Barbiturates Screen, Urine Latest Ref Range: Negative   Negative     Benzodiazepine Screen, Urine Latest Ref Range: Negative   Negative     Buprenorphine, Screen, Urine Latest Ref Range: Negative   Negative     Cocaine Screen, Urine Latest Ref Range: Negative   Negative     Methadone Screen , Urine Latest Ref Range: Negative   Negative     Opiate Screen, Urine Latest Ref Range: Negative   Negative     Oxycodone Screen, Urine Latest Ref Range: Negative   Negative     Phencyclidine (PCP), Urine Latest Ref Range: Negative   Negative     THC Screen, Urine Latest Ref Range: Negative   Negative       Condition on Discharge:  improved    Vital Signs  Temp:  [98 °F (36.7 °C)-98.7 °F (37.1 °C)] 98.7 °F (37.1 °C)  Heart Rate:  [68-71] 68  Resp:  [18] 18  BP: (133)/(72-80) 133/72      Discharge Disposition:  Home or Self Care    Discharge Medications:     Discharge Medications      New Medications      Instructions Start Date   ARIPiprazole 10 MG tablet  Commonly known as:  ABILIFY   10 mg, Oral, Daily   Start Date:  6/19/2018        Stop These Medications    risperiDONE 3 MG tablet  Commonly known as:  risperDAL            Discharge Diet: regular     Activity at Discharge: as tolerated    Follow-up Appointments  Future Appointments  Date Time Provider Department Center   6/19/2018 3:00 PM SHANIKA Ahmadi [facility] None   7/5/2018 3:45 PM Layla Persaud LCSW [facility] None         Test Results Pending at Discharge      Clinician:   Gareth Lynn MD  06/18/18  1:38 PM

## 2018-06-18 NOTE — PLAN OF CARE
"Problem: Patient Care Overview  Goal: Individualization and Mutuality  Outcome: Ongoing (interventions implemented as appropriate)   06/15/18 1311   Personal Strengths/Vulnerabilities   Patient Personal Strengths expressive of needs;expressive of emotions;motivated for treatment   Patient Vulnerabilities Ineffective coping skills, limited insight.     DATA: Met with patient who reported that she would like to \"have a plan B\".  She reported that she would like for things to work out with her and her aunt but she is concerned about the situation.  She reported that she would just like to know other options.  Discussed with patient that without income her options would be very limited.  She reported that she has applied for disability but has not hear the outcome yet.  She discussed how she would like for her conversation to be private and not shared with her aunt.    ASSESSMENT:  Patient is denying depression and anxiety.  She denies suicidal ideation and denies homicidal ideation.  Patient appears paranoid today.    PLAN:  Patient will continue stabilization.  Patient will return home with her aunt upon stabilization and will follow up in the [facility].      "

## 2018-06-25 ENCOUNTER — OFFICE VISIT (OUTPATIENT)
Dept: PSYCHIATRY | Facility: CLINIC | Age: 53
End: 2018-06-25

## 2018-06-25 VITALS — DIASTOLIC BLOOD PRESSURE: 83 MMHG | HEIGHT: 62 IN | SYSTOLIC BLOOD PRESSURE: 133 MMHG | HEART RATE: 60 BPM

## 2018-06-25 DIAGNOSIS — F31.30 BIPOLAR I DISORDER, MOST RECENT EPISODE DEPRESSED (HCC): Primary | ICD-10-CM

## 2018-06-25 PROCEDURE — 99214 OFFICE O/P EST MOD 30 MIN: CPT | Performed by: NURSE PRACTITIONER

## 2018-06-25 RX ORDER — ARIPIPRAZOLE 10 MG/1
10 TABLET ORAL DAILY
Qty: 30 TABLET | Refills: 0 | Status: SHIPPED | OUTPATIENT
Start: 2018-06-25

## 2018-06-25 NOTE — PROGRESS NOTES
"      Subjective   Kelly Powell is a 52 y.o. female is here today for medication management follow-up.Presents with her aunt with whom she     Chief Complaint:  \"I'm doing better\"    History of Present Illness:  The patient presents with her aunt with whom she gives permission to speak in front of.  A recent discharge from the [facility] center.  Patient states that she is doing decent.  She states that she feels better and has less side effects on the Abilify and knows that she will not see the full effect of the medication for another couple of weeks.  She denies any suicidal thoughts, homicidal thoughts, or any AV hallucinations.  She will not write depression today she states she is not depressed as of right now.  States she cannot read or anxiety either states that she gets anxious thinking about things but does not really know how to write it.  States her mind races at night.  She denies any nightmares.There is no height or weight on file to calculate BMI.  She refused to wait today.  States she does not have any appetite changes that she has noticed.  She denies any negative side effects to the Abilify.  She does state nervous and worries about things.    The following portions of the patient's history were reviewed and updated as appropriate: allergies, current medications, past family history, past medical history, past social history, past surgical history and problem list.    Review of Systems   Constitutional: Negative for activity change, appetite change and fatigue.   HENT: Negative.    Eyes: Negative for visual disturbance.   Respiratory: Negative.    Cardiovascular: Negative.    Gastrointestinal: Negative for nausea.   Endocrine: Negative.    Genitourinary: Negative.    Musculoskeletal: Negative for arthralgias.        Right shoulder pain   Skin: Negative.    Allergic/Immunologic: Negative.    Neurological: Negative for dizziness, seizures and headaches.   Hematological: Negative.  " "  Psychiatric/Behavioral: Positive for agitation. Negative for behavioral problems, confusion, decreased concentration, dysphoric mood, hallucinations, self-injury, sleep disturbance and suicidal ideas. The patient is nervous/anxious. The patient is not hyperactive.        Objective   Physical Exam   Constitutional: She appears well-developed and well-nourished.   Psychiatric: Her speech is normal and behavior is normal. Thought content normal. Her affect is blunt. Cognition and memory are normal. She expresses impulsivity.   Vitals reviewed.    Blood pressure 133/83, pulse 60, height 157.5 cm (62\").    Medication List:   Current Outpatient Prescriptions   Medication Sig Dispense Refill   • ARIPiprazole (ABILIFY) 10 MG tablet Take 1 tablet by mouth Daily. 30 tablet 0     No current facility-administered medications for this visit.        Mental Status Exam:   Hygiene:   good  Cooperation:  Cooperative  Eye Contact:  Good  Psychomotor Behavior:  Appropriate  Affect:  Blunted  Hopelessness: 1  Speech:  Normal  Thought Process:  Goal directed  Thought Content:  Normal  Suicidal:  None  Homicidal:  None  Hallucinations:  None  Delusion:  None  Memory:  Intact  Orientation:  Person, Place, Time and Situation  Reliability:  fair  Insight:  Fair  Judgement:  Fair  Impulse Control:  Fair  Physical/Medical Issues:  No     Assessment/Plan   Problems Addressed this Visit     None      Visit Diagnoses     Bipolar I disorder, most recent episode depressed    -  Primary    Relevant Medications    ARIPiprazole (ABILIFY) 10 MG tablet              Discussed medication options.  Patient believes that she is doing good on the current medication and does not need any additional medication as of now.  I will continue her Abilify 10 mg.  We discussed the need for periodic monitoring of cholesterol, blood sugars, and to watch for weight gain.  She is to continue therapy with [facility].  She will consider Atarax for anxiety however does not " want to take another medication today.  Reviewed the risks, benefits, and side effects of the medications; patient acknowledged and verbally consented.  Patient is agreeable to call the [facility].  Patient is aware to call 911 or go to the nearest ER should begin having SI/HI.

## 2018-07-05 ENCOUNTER — OFFICE VISIT (OUTPATIENT)
Dept: PSYCHIATRY | Facility: CLINIC | Age: 53
End: 2018-07-05

## 2018-07-05 DIAGNOSIS — F31.30 BIPOLAR I DISORDER, MOST RECENT EPISODE DEPRESSED (HCC): Primary | ICD-10-CM

## 2018-07-05 PROCEDURE — 90837 PSYTX W PT 60 MINUTES: CPT | Performed by: SOCIAL WORKER

## 2018-07-05 NOTE — PROGRESS NOTES
Date of Service: 2018  Time In:  3:45 PM  Time Out:  4:45 PM      PROGRESS NOTE  Data:  Kelly Powell is a 52 y.o. female who met with the undersigned for a regularly scheduled individual outpatient therapy session at the [facility].     HPI:   Patient reports she was in the [facility] for several days last month.  They changed her medication and she feels like she is doing better, however she still has some difficulty with sleeping.  Patient reports that she is still staying with her aunt, but would like to have her own place. There were a lot of people around for the holiday which made patient anxious.  She reports she kept to herself.  Her father came to visit and gave her a little bit of money, so she was able to buy a few clothes since she lost everything she had in [place].  She has also been trying to buy a little bit of food, mostly junk food, so that she doesn't have to eat too much of her aunt's food.  She has applied for SSI.  Patient reports she is feeling hopeless, but not suicidal, because she is having to start from scratch to rebuild her life and to obtain basic needs.  She is considering returning to [place] and staying with her father again, but is concerned about how she can deal with anticipated conflict with her sister, as that is why she left [place] previously.    Patient also said that returning to her father's home will be difficult because her mother  while patient was not there.  She said the loss of her mother has not really sunk in yet, because she was gone and didn't know when it happened.  She has some guilt about not being there, and she thinks being at the family home will be difficult.        Clinical Maneuvering/Intervention:  Assisted patient in processing above session content; acknowledged and normalized patient’s thoughts, feelings, and concerns.  Provided empathy and support as patient processed the above issues.  Supported patient taking care of herself  by avoiding the large group of people at times.  Encouraged her to research subsidized housing in the area, which she would be able to afford when she gets SSI.  Encouraged patient to talk with her sister and father about her bipolar diagnosis, and ask for their support and cooperation while she is learning how to manage it.    Encouraged and supported patient as she expressed her grief and sadness about the loss of her mother.  Normalized her difficulty taking in the reality that her mother is no longer with her.    Allowed patient to freely discuss issues without interruption or judgment. Provided safe, confidential environment to facilitate the development of positive therapeutic relationship and encourage open, honest communication. Assisted patient in identifying risk factors which would indicate the need for higher level of care including thoughts to harm self or others and/or self-harming behavior and encouraged patient to contact this office, call 911, or present to the nearest emergency room should any of these events occur. Discussed crisis intervention services and means to access.  Patient adamantly and convincingly denies current suicidal or homicidal ideation or perceptual disturbance.    Assessment    Patient seemed calm her and less paranoid today, but still feeling overwhelmed by her situation she will need ongoing individual outpatient therapy to address management of her illness and grief issues.    Diagnoses and all orders for this visit:    Bipolar I disorder, most recent episode depressed (CMS/[facility])      Mental Status Exam  Hygiene:  good  Dress:  casual  Attitude:  Cooperative  Motor Activity:  Appropriate  Speech:  Normal  Mood:  depressed  Affect:  depressed  Thought Processes:  Goal directed and Linear  Thought Content:  normal  Suicidal Thoughts:  denies  Homicidal Thoughts:  denies  Crisis Safety Plan: yes, to come to the emergency room.  Hallucinations:  denies    Patient's Support  Network Includes:  father, extended family and Aunt    Progress toward goal: Not at goal    Functional Status: Moderate impairment     Prognosis: Guarded with Ongoing Treatment    Plan   Patient will continue individual outpatient therapy every 2 weeks with focus on improved functioning and coping skills, processing grief, and avoiding decompensation and the need for a higher level of care.    Patient will adhere to medication regimen as prescribed and report any side effects. Patient will contact this office, call 911 or present to the nearest emergency room should suicidal or homicidal ideations occur. Provide Cognitive Behavioral Therapy and Integrative Therapy to improve functioning, maintain stability, and avoid decompensation and the need for higher level of care.        Return in about 3 weeks (around 7/26/2018).      This document signed by Layla Persaud LCSW                               July 6, 2018 9:03 AM